# Patient Record
Sex: FEMALE | Race: WHITE | NOT HISPANIC OR LATINO | URBAN - METROPOLITAN AREA
[De-identification: names, ages, dates, MRNs, and addresses within clinical notes are randomized per-mention and may not be internally consistent; named-entity substitution may affect disease eponyms.]

---

## 2022-03-25 ENCOUNTER — *OTHER (OUTPATIENT)
Dept: URBAN - METROPOLITAN AREA CLINIC 24 | Facility: CLINIC | Age: 32
Setting detail: DERMATOLOGY
End: 2022-03-25

## 2022-03-25 ENCOUNTER — RX ONLY (RX ONLY)
Age: 32
End: 2022-03-25

## 2022-03-25 DIAGNOSIS — L57.0 ACTINIC KERATOSIS: ICD-10-CM

## 2022-03-25 PROCEDURE — 99203 OFFICE O/P NEW LOW 30 MIN: CPT

## 2022-03-25 RX ORDER — CLOBETASOL PROPIONATE 0.5 MG/G
AS DIRECTED OINTMENT TOPICAL ONCE A DAY
Qty: 60 | Refills: 3
Start: 2022-03-25

## 2022-03-25 RX ORDER — HYDROCORTISONE ACETATE, IODOQUINOL 19; 10 MG/G; MG/G
1 A SMALL AMOUNT CREAM TOPICAL TWICE A DAY
Qty: 2 | Refills: 6
Start: 2022-03-25

## 2022-04-07 ENCOUNTER — RX ONLY (RX ONLY)
Age: 32
End: 2022-04-07

## 2022-04-07 RX ORDER — HYDROCORTISONE ACETATE, IODOQUINOL 19; 10 MG/G; MG/G
1 A SMALL AMOUNT CREAM TOPICAL TWICE A DAY
Qty: 2 | Refills: 6
Start: 2022-04-07

## 2022-05-06 ENCOUNTER — *OTHER (OUTPATIENT)
Dept: URBAN - METROPOLITAN AREA CLINIC 24 | Facility: CLINIC | Age: 32
Setting detail: DERMATOLOGY
End: 2022-05-06

## 2022-05-06 DIAGNOSIS — L57.0 ACTINIC KERATOSIS: ICD-10-CM

## 2022-05-06 PROCEDURE — 99213 OFFICE O/P EST LOW 20 MIN: CPT

## 2023-02-03 ENCOUNTER — RX ONLY (RX ONLY)
Age: 33
End: 2023-02-03

## 2023-02-03 RX ORDER — BETAMETHASONE DIPROPIONATE 0.5 MG/G
0.06 OINTMENT TOPICAL BID
Qty: 45 | Refills: 3 | Status: ERX | COMMUNITY
Start: 2023-02-03

## 2023-02-06 ENCOUNTER — RX ONLY (RX ONLY)
Age: 33
End: 2023-02-06

## 2023-02-06 RX ORDER — TRIAMCINOLONE ACETONIDE 1 MG/G
0.1% OINTMENT TOPICAL NIGHTLY
Qty: 80 | Refills: 3 | Status: ERX | COMMUNITY
Start: 2023-02-06

## 2023-03-08 ENCOUNTER — APPOINTMENT (OUTPATIENT)
Dept: URBAN - METROPOLITAN AREA CLINIC 193 | Age: 33
Setting detail: DERMATOLOGY
End: 2023-03-09

## 2023-03-08 DIAGNOSIS — L24 IRRITANT CONTACT DERMATITIS: ICD-10-CM

## 2023-03-08 PROBLEM — L24.89 IRRITANT CONTACT DERMATITIS DUE TO OTHER AGENTS: Status: ACTIVE | Noted: 2023-03-08

## 2023-03-08 PROCEDURE — OTHER PRESCRIPTION MEDICATION MANAGEMENT: OTHER

## 2023-03-08 PROCEDURE — OTHER PRESCRIPTION: OTHER

## 2023-03-08 PROCEDURE — 99213 OFFICE O/P EST LOW 20 MIN: CPT

## 2023-03-08 PROCEDURE — OTHER COUNSELING: OTHER

## 2023-03-08 RX ORDER — DESOXIMETASONE 2.5 MG/G
.25% OINTMENT TOPICAL DAILY
Qty: 100 | Refills: 4 | Status: ERX | COMMUNITY
Start: 2023-03-08

## 2023-03-08 ASSESSMENT — LOCATION DETAILED DESCRIPTION DERM
LOCATION DETAILED: LEFT RADIAL DORSAL HAND
LOCATION DETAILED: RIGHT RADIAL DORSAL HAND

## 2023-03-08 ASSESSMENT — LOCATION SIMPLE DESCRIPTION DERM
LOCATION SIMPLE: RIGHT HAND
LOCATION SIMPLE: LEFT HAND

## 2023-03-08 ASSESSMENT — LOCATION ZONE DERM: LOCATION ZONE: HAND

## 2023-03-08 NOTE — PROCEDURE: PRESCRIPTION MEDICATION MANAGEMENT
Plan: Strongly discouraged wearing gloves of any type as the pattern suggests that the gloves are a potential irritant/allergen.  Encouraged using the Desoximetasone ointment plus aquaphor and avoiding excessive exposure to water, washing only the palmar surface and not scrubbing the tops of her hands as much as possible.  Advised to avoid chemical exposure and topical THC at this time to rule out other potential allergens/irritants
Render In Strict Bullet Format?: No
Initiate Treatment: OTC Aquaphor
Detail Level: Zone

## 2023-03-08 NOTE — HPI: ECZEMA (PATIENT REPORTED)
Where Is Your Eczema Located?: Bilateral wrist and right thigh.
List Prescription Topical Steroids You Tried (Separate Each Name With A Comma):: Clobetasol ointment
List Prescription Topical Steroids You Are Currently Using (Separate Each Name With A Comma):: Triamcinolone.1%
Additional Comments (Use Complete Sentences): Patient reports rash to be on bilateral wrist and right thigh. Present for x years. Previously treated with Clobetasol ointment. Currently treating with Triamcinolone ointment

## 2023-04-21 ENCOUNTER — APPOINTMENT (OUTPATIENT)
Dept: URBAN - METROPOLITAN AREA CLINIC 193 | Age: 33
Setting detail: DERMATOLOGY
End: 2023-04-23

## 2023-04-21 DIAGNOSIS — L24 IRRITANT CONTACT DERMATITIS: ICD-10-CM

## 2023-04-21 PROBLEM — L24.89 IRRITANT CONTACT DERMATITIS DUE TO OTHER AGENTS: Status: ACTIVE | Noted: 2023-04-21

## 2023-04-21 PROCEDURE — 99213 OFFICE O/P EST LOW 20 MIN: CPT

## 2023-04-21 PROCEDURE — OTHER COUNSELING: OTHER

## 2023-04-21 PROCEDURE — OTHER PRESCRIPTION MEDICATION MANAGEMENT: OTHER

## 2023-04-21 ASSESSMENT — LOCATION ZONE DERM: LOCATION ZONE: HAND

## 2023-04-21 ASSESSMENT — LOCATION SIMPLE DESCRIPTION DERM
LOCATION SIMPLE: LEFT HAND
LOCATION SIMPLE: RIGHT HAND

## 2023-04-21 ASSESSMENT — LOCATION DETAILED DESCRIPTION DERM
LOCATION DETAILED: LEFT RADIAL DORSAL HAND
LOCATION DETAILED: RIGHT RADIAL DORSAL HAND

## 2023-04-21 NOTE — PROCEDURE: PRESCRIPTION MEDICATION MANAGEMENT
Plan: Strongly discouraged wearing gloves of any type as the pattern suggests that the gloves are a potential irritant/allergen.  Continue Desoximetasone ointment plus aquaphor and avoiding excessive exposure to water, washing only the palmar surface and not scrubbing the tops of her hands as much as possible.  Advised to avoid chemical exposure and topical THC at this time to rule out other potential allergens/irritants. Recommend using Vanicream products and also recommend patch testing
Samples Given: Neutrogena [place] Formular Hand cream after every hand washing
Render In Strict Bullet Format?: No
Detail Level: Zone
Continue Regimen: Desoximetasone ointment prn flares, Aquaphor nightly

## 2023-10-25 RX ORDER — DESOXIMETASONE 2.5 MG/G
.25% OINTMENT TOPICAL DAILY
Qty: 100 | Refills: 4 | Status: ERX

## 2024-01-31 ENCOUNTER — RX ONLY (RX ONLY)
Age: 34
End: 2024-01-31

## 2024-01-31 RX ORDER — CLOBETASOL PROPIONATE 0.5 MG/G
OINTMENT TOPICAL QD
Qty: 45 | Refills: 2 | Status: CANCELLED | COMMUNITY
Start: 2024-01-31

## 2024-01-31 RX ORDER — DESOXIMETASONE 2.5 MG/G
.25% OINTMENT TOPICAL DAILY
Qty: 100 | Refills: 4 | Status: ERX

## 2024-10-18 RX ORDER — DESOXIMETASONE 2.5 MG/G
.25% OINTMENT TOPICAL DAILY
Qty: 100 | Refills: 1 | Status: ERX

## 2025-01-31 ENCOUNTER — APPOINTMENT (OUTPATIENT)
Dept: URBAN - METROPOLITAN AREA CLINIC 193 | Age: 35
Setting detail: DERMATOLOGY
End: 2025-02-01

## 2025-01-31 DIAGNOSIS — L24 IRRITANT CONTACT DERMATITIS: ICD-10-CM

## 2025-01-31 PROBLEM — L24.89 IRRITANT CONTACT DERMATITIS DUE TO OTHER AGENTS: Status: ACTIVE | Noted: 2025-01-31

## 2025-01-31 PROCEDURE — OTHER PRESCRIPTION MEDICATION MANAGEMENT: OTHER

## 2025-01-31 PROCEDURE — 99213 OFFICE O/P EST LOW 20 MIN: CPT

## 2025-01-31 PROCEDURE — OTHER COUNSELING: OTHER

## 2025-01-31 PROCEDURE — OTHER PRESCRIPTION: OTHER

## 2025-01-31 PROCEDURE — OTHER MIPS QUALITY: OTHER

## 2025-01-31 RX ORDER — DESOXIMETASONE 0.5 MG/G
OINTMENT TOPICAL
Qty: 100 | Refills: 6 | Status: ERX | COMMUNITY
Start: 2025-01-31

## 2025-01-31 ASSESSMENT — LOCATION DETAILED DESCRIPTION DERM
LOCATION DETAILED: LEFT RADIAL DORSAL HAND
LOCATION DETAILED: RIGHT RADIAL DORSAL HAND

## 2025-01-31 ASSESSMENT — LOCATION ZONE DERM: LOCATION ZONE: HAND

## 2025-01-31 ASSESSMENT — LOCATION SIMPLE DESCRIPTION DERM
LOCATION SIMPLE: LEFT HAND
LOCATION SIMPLE: RIGHT HAND

## 2025-01-31 NOTE — PROCEDURE: PRESCRIPTION MEDICATION MANAGEMENT
Plan: Refills sent to patients pharmacy
Render In Strict Bullet Format?: No
Detail Level: Zone
Continue Regimen: Desoximetasone ointment prn flares, Aquaphor nightly

## 2025-01-31 NOTE — HPI: RASH
What Type Of Note Output Would You Prefer (Optional)?: Standard Output
Is This A New Presentation, Or A Follow-Up?: Rash
Additional History: patient reports today with a rash on bilateral hands and right thigh. Present for x months. She has been applying Desoximetasone ointment which was prescribed in 2023.

## 2025-02-03 RX ORDER — DESOXIMETASONE 0.5 MG/G
OINTMENT TOPICAL QD
Qty: 100 | Refills: 6 | Status: ERX

## 2025-08-19 ENCOUNTER — APPOINTMENT (OUTPATIENT)
Dept: URBAN - METROPOLITAN AREA CLINIC 193 | Age: 35
Setting detail: DERMATOLOGY
End: 2025-08-25

## 2025-08-19 DIAGNOSIS — L20.89 OTHER ATOPIC DERMATITIS: ICD-10-CM

## 2025-08-19 DIAGNOSIS — L30.1 DYSHIDROSIS [POMPHOLYX]: ICD-10-CM

## 2025-08-19 PROCEDURE — OTHER COUNSELING: OTHER

## 2025-08-19 PROCEDURE — OTHER PRESCRIPTION MEDICATION MANAGEMENT: OTHER

## 2025-08-19 PROCEDURE — 99213 OFFICE O/P EST LOW 20 MIN: CPT

## 2025-08-19 ASSESSMENT — LOCATION DETAILED DESCRIPTION DERM
LOCATION DETAILED: RIGHT HYPOTHENAR EMINENCE
LOCATION DETAILED: PERIUMBILICAL SKIN
LOCATION DETAILED: RIGHT PROXIMAL DORSAL INDEX FINGER
LOCATION DETAILED: LEFT ULNAR PALM
LOCATION DETAILED: 2ND WEB SPACE LEFT HAND

## 2025-08-19 ASSESSMENT — LOCATION SIMPLE DESCRIPTION DERM
LOCATION SIMPLE: ABDOMEN
LOCATION SIMPLE: RIGHT INDEX FINGER
LOCATION SIMPLE: LEFT HAND
LOCATION SIMPLE: RIGHT HAND

## 2025-08-19 ASSESSMENT — ITCH NUMERIC RATING SCALE: HOW SEVERE IS YOUR ITCHING?: 3

## 2025-08-19 ASSESSMENT — LOCATION ZONE DERM
LOCATION ZONE: FINGER
LOCATION ZONE: TRUNK
LOCATION ZONE: HAND

## 2025-08-19 ASSESSMENT — BSA RASH: BSA RASH: 3
